# Patient Record
(demographics unavailable — no encounter records)

---

## 2024-12-13 NOTE — PHYSICAL EXAM
[Normal] : affect was normal and insight and judgment were intact [de-identified] : right ptosis, otherwise eyes are symmetrical, no swelling appreciated. [de-identified] : thyromegaly

## 2024-12-13 NOTE — HISTORY OF PRESENT ILLNESS
[FreeTextEntry1] : f/u [de-identified] : 69 yo F with DM, HTN, HLD, OA, neuropathy presents for f/u.  DM-- needs eye doctor. Has not been great with diet lately, gained a few lbs. Pt reports left eye swollen? Visiting nurse pointed out to her.  Needs neuro-- having issues tingling and weakness in hand.  thyromegaly-- has not had thyroid US in past.

## 2025-03-26 NOTE — PHYSICAL EXAM
[Normal] : affect was normal and insight and judgment were intact [de-identified] : pt reports pain on light touch. Joints non swollen/non erythematous. There is no obvious deformity.  strength diminished.

## 2025-03-26 NOTE — HEALTH RISK ASSESSMENT
[Never] : Never [NO] : No [No Retinopathy] : No retinopathy [No] : In the past 12 months have you used drugs other than those required for medical reasons? No [No falls in past year] : Patient reported no falls in the past year [Yes] : Reviewed medication list for presence of high-risk medications. [Benzodiazepines] : benzodiazepines [Opioids] : opioids [Blood Thinners] : blood thinners [Muscle Relaxants] : muscle relaxants [Sedatives] : sedatives [Fully functional (bathing, dressing, toileting, transferring, walking, feeding)] : Fully functional (bathing, dressing, toileting, transferring, walking, feeding) [Reports normal functional visual acuity (ie: able to read med bottle)] : Reports normal functional visual acuity [EyeExamDate] : 1/25 [Reports changes in hearing] : Reports no changes in hearing [Reports changes in vision] : Reports no changes in vision [Reports changes in dental health] : Reports no changes in dental health

## 2025-03-26 NOTE — HISTORY OF PRESENT ILLNESS
[de-identified] : 69 yo F with hx OA, HTN, HLD, DM, osteopenia presents for annual.  Pt reports worsening OA in hands-- unable to  things, dropping things constantly. Reports hands also painful to touch and is very weak. Last seen rheum several years ago-- dx OA. Had neg workup for RA. Rechecked for RA last year, labs still neg. Pt reports can't really do anything for herself. has home health aide for 4 hrs daily, wants more hours.   Pt also reports eyesight very blurry, constant tearing as well. Pt reports can't read small print. Pt saw reg opthal who noted glaucoma and ptosis. Rec another ophthal. Visual acuity is 20/20 and 20/40 without corrective lenses per evaluation.

## 2025-03-26 NOTE — PHYSICAL EXAM
[Normal] : affect was normal and insight and judgment were intact [de-identified] : pt reports pain on light touch. Joints non swollen/non erythematous. There is no obvious deformity.  strength diminished.

## 2025-03-26 NOTE — PHYSICAL EXAM
[Normal] : affect was normal and insight and judgment were intact [de-identified] : pt reports pain on light touch. Joints non swollen/non erythematous. There is no obvious deformity.  strength diminished.

## 2025-03-26 NOTE — HISTORY OF PRESENT ILLNESS
[de-identified] : 67 yo F with hx OA, HTN, HLD, DM, osteopenia presents for annual.  Pt reports worsening OA in hands-- unable to  things, dropping things constantly. Reports hands also painful to touch and is very weak. Last seen rheum several years ago-- dx OA. Had neg workup for RA. Rechecked for RA last year, labs still neg. Pt reports can't really do anything for herself. has home health aide for 4 hrs daily, wants more hours.   Pt also reports eyesight very blurry, constant tearing as well. Pt reports can't read small print. Pt saw reg opthal who noted glaucoma and ptosis. Rec another ophthal. Visual acuity is 20/20 and 20/40 without corrective lenses per evaluation.

## 2025-03-26 NOTE — HISTORY OF PRESENT ILLNESS
[de-identified] : 67 yo F with hx OA, HTN, HLD, DM, osteopenia presents for annual.  Pt reports worsening OA in hands-- unable to  things, dropping things constantly. Reports hands also painful to touch and is very weak. Last seen rheum several years ago-- dx OA. Had neg workup for RA. Rechecked for RA last year, labs still neg. Pt reports can't really do anything for herself. has home health aide for 4 hrs daily, wants more hours.   Pt also reports eyesight very blurry, constant tearing as well. Pt reports can't read small print. Pt saw reg opthal who noted glaucoma and ptosis. Rec another ophthal. Visual acuity is 20/20 and 20/40 without corrective lenses per evaluation.

## 2025-05-05 NOTE — PHYSICAL EXAM
[FreeTextEntry1] : Constitutional: alert. Psychiatric: oriented to person, place, and time. Neurologic:  Orientation: oriented to person, oriented to place and oriented to time.  Memory: short term memory intact.  Language: fluency intact.  Fund of knowledge: displays adequate knowledge of current events.  Cranial Nerves: visual acuity intact bilaterally, visual fields full to confrontation, pupils equal round and reactive to light, extraocular motion intact, facial sensation intact symmetrically, face symmetrical, hearing was intact bilaterally, head turning and shoulder shrug symmetric and there was no tongue deviation with protrusion.  Motor: muscle tone was normal in all four extremities and muscle strength was normal in all four extremities.  Sensory exam: light touch was intact.  Coordination:. Finger to nose dysmetria was not present.  normal gait Deep tendon reflexes: Biceps right 1+. Biceps left 1+. Patella right 1+. Patella left 1+. Tenderness to palpation of her joints

## 2025-05-05 NOTE — DISCUSSION/SUMMARY
[FreeTextEntry1] : 68-year-old woman who is here for initial consultation of whole body pain that might be due to diffuse arthritis.  Patient will follow-up with her primary for that.  Patient may also have diabetic neuropathy and she was advised to control her sugars as her last hemoglobin A1c was elevated at 9.  Patient will return for nerve conduction EMG studies to evaluate for any right hand carpal tunnel that might be worsening her right hand above all other extremities.  Patient will also have blood work for reversible causes of neuropathy.  Patient may start gabapentin during her follow-up visit.  I spent the time noted on the day of this patient encounter preparing for, review of medical records,review of pertinent diagnostic studies, providing and documenting the above E/M service and counseling and educate patient on differential, workup, disease course, and treatment/management. Education was provided to the patient during this encounter. All questions and concerns were answered and addressed in detail. The patient verbalized understanding and agreed to plan. Patient was advised to continue to monitor for neurologic symptoms and to notify my office or go to the nearest emergency room if there are any changes. Any orders/referrals and communications were provided as well. Side effects of the above medications were discussed in detail including but not limited to applicable black box warning and teratogenicity as appropriate. Patient was advised to bring previous records to my office. A copy of the consult note will be sent to the referring physician.

## 2025-05-05 NOTE — HISTORY OF PRESENT ILLNESS
[FreeTextEntry1] : 68-year-old woman who is here for initial consultation of whole body pain for the past 10 years.  Patient states that she hurts all over and especially in her right hand.  The areas that are painful include the joint spaces.  Patient's last hemoglobin A1c was at 9 as of March 2025.  Patient denies any numbness or tingling in her arm or body.  Patient was offered but declined any neuropathic medication.  Patient would like to have further testing even though patient was advised that her pain is likely due to osteoarthritis and diabetic neuropathy from uncontrolled diabetes.

## 2025-05-09 NOTE — HISTORY OF PRESENT ILLNESS
[FreeTextEntry1] : Patient presents today because of pain around her Achilles tendon. She had a history of an infected wound at her Achilles. That is healed. She complains of pain. She feels her diabetes is out of control. Her A1c was 9 recently. She complains of generalized pain that is giving her a hard time walking.

## 2025-05-09 NOTE — PHYSICAL EXAM
[Delayed in the Right Toes] : capillary refills delayed in the right toes [Delayed in the Left Toes] : capillary refills delayed in the left toes [0] : left foot dorsalis pedis 0 [Vibration Dec.] : diminished vibratory sensation at the level of the toes [Position Sense Dec.] : diminished position sense at the level of the toes [Diminished Throughout Right Foot] : diminished sensation with monofilament testing throughout right foot [Diminished Throughout Left Foot] : diminished sensation with monofilament testing throughout left foot [de-identified] : Her Achilles is intact. She has generalized pain at the Achilles. I think some of it is structural and some of it is from her neuropathy. [FreeTextEntry1] : She has no signs of infection. She has healed wound from an old infection. There is no leakage. There is no warmth.

## 2025-05-09 NOTE — PHYSICAL EXAM
[Delayed in the Right Toes] : capillary refills delayed in the right toes [Delayed in the Left Toes] : capillary refills delayed in the left toes [0] : left foot dorsalis pedis 0 [Vibration Dec.] : diminished vibratory sensation at the level of the toes [Position Sense Dec.] : diminished position sense at the level of the toes [Diminished Throughout Right Foot] : diminished sensation with monofilament testing throughout right foot [Diminished Throughout Left Foot] : diminished sensation with monofilament testing throughout left foot [de-identified] : Her Achilles is intact. She has generalized pain at the Achilles. I think some of it is structural and some of it is from her neuropathy. [FreeTextEntry1] : She has no signs of infection. She has healed wound from an old infection. There is no leakage. There is no warmth.

## 2025-05-09 NOTE — ASSESSMENT
[FreeTextEntry1] : Impression: Achilles tendonitis.  Treatment: She does not stretch at all. I gave her some light stretches to work on that are very easy to do with a towel. I wrote for P4 topical pain cream, and I referred her to V[facility] because of non-palpable pedal pulses, which also could be contributing to the situation. She needs to do a better job in term of controlling her glycemic index. She will follow-up for evaluation as needed.

## 2025-07-01 NOTE — REASON FOR VISIT
Patient requested gas assistance today. OSW provided patient's son with a gas voucher through [facility]. Patient's son expressed appreciation for assistance.    [Follow-Up Visit] : a follow-up visit for [Foot Pain] : foot pain

## 2025-07-01 NOTE — HISTORY OF PRESENT ILLNESS
[FreeTextEntry1] : f/u [de-identified] : 67 yo F with HTN, HLD, DM, arthritis presents for fu.  Previously DM had been well controlled until recently. Last a1c was 9. She has since been working on diet. Not much exercise due to chronic pain. Pt has hx arthritis-- unclear if rheumatoid arthritis but RA labs have been negative. Has seen rheum in past as well. She would like consultation with rheum as she has chronic pain in hands, knees, ankles, back.   Pt had flu recently-- sick contact. She also reports dizziness-- not always with standing or position. She has been fasting in efforts to bring down a1c. She also has daily headaches.